# Patient Record
Sex: MALE | Race: WHITE | NOT HISPANIC OR LATINO | URBAN - NONMETROPOLITAN AREA
[De-identification: names, ages, dates, MRNs, and addresses within clinical notes are randomized per-mention and may not be internally consistent; named-entity substitution may affect disease eponyms.]

---

## 2017-10-23 NOTE — PATIENT DISCUSSION
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 1.5 DD - SUPERIOR/ FLAT - Plan : Monitor for changes.

## 2017-10-23 NOTE — PATIENT DISCUSSION
(J52.032) Other secondary cataract, left eye - Assesment : Significant posterior capsule opacification present. - Plan : YAG OS

## 2017-10-23 NOTE — PATIENT DISCUSSION
(H35.373) Puckering of macula, bilateral - Assesment : Examination revealed ERM. - Plan : Monitor. Advised patient to call our office with decreased vision or increased symptoms.  Rtc 1 year Exam

## 2017-12-04 NOTE — PATIENT DISCUSSION
(I51.444) Other secondary cataract, left eye - Assesment : s/p Yag Cap. Doing well. - Plan : Monitor for changes. RTC in October for Exam, sooner if problems or changes occur.

## 2018-10-09 NOTE — PATIENT DISCUSSION
(H35.373) Puckering of macula, bilateral - Assesment : Examination revealed ERM. - Plan : Monitor. Advised patient to call our office with decreased vision or increased symptoms.  Rtc 1 year Exam/ MAC OCT

## 2019-05-06 ENCOUNTER — IMPORTED ENCOUNTER (OUTPATIENT)
Dept: URBAN - NONMETROPOLITAN AREA CLINIC 1 | Facility: CLINIC | Age: 44
End: 2019-05-06

## 2019-05-06 PROBLEM — H52.03: Noted: 2019-05-06

## 2019-05-06 PROBLEM — H25.13: Noted: 2019-05-06

## 2019-05-06 PROBLEM — H52.4: Noted: 2019-05-06

## 2019-05-06 PROCEDURE — S0620 ROUTINE OPHTHALMOLOGICAL EXA: HCPCS

## 2019-05-06 NOTE — PATIENT DISCUSSION
Hyperopia OU-new spectacle Rx issued-monitor yearly or prn Cataracts OU-not yet surgical-no vision complaints-monitor yearly

## 2022-04-15 ASSESSMENT — TONOMETRY
OD_IOP_MMHG: 15
OS_IOP_MMHG: 17

## 2022-04-15 ASSESSMENT — VISUAL ACUITY
OS_CC: 20/20
OU_CC: 20/20
OD_CC: 20/20

## 2022-10-18 NOTE — PATIENT DISCUSSION
MAC OCT performed today OU . MRX from today fluctuating , recommend rechecking in future if pt wishes to get new glasses .

## 2024-08-19 ENCOUNTER — COMPREHENSIVE EXAM (OUTPATIENT)
Dept: URBAN - NONMETROPOLITAN AREA CLINIC 4 | Facility: CLINIC | Age: 49
End: 2024-08-19

## 2024-08-19 DIAGNOSIS — H52.01: ICD-10-CM

## 2024-08-19 DIAGNOSIS — H52.4: ICD-10-CM

## 2024-08-19 PROCEDURE — 92015 DETERMINE REFRACTIVE STATE: CPT

## 2024-08-19 PROCEDURE — 92004 COMPRE OPH EXAM NEW PT 1/>: CPT

## 2024-08-19 ASSESSMENT — VISUAL ACUITY
OS_SC: 20/20
OD_SC: 20/20

## 2024-08-19 ASSESSMENT — TONOMETRY
OD_IOP_MMHG: 13
OS_IOP_MMHG: 15

## 2025-08-25 ENCOUNTER — COMPREHENSIVE EXAM (OUTPATIENT)
Age: 50
End: 2025-08-25

## 2025-08-25 DIAGNOSIS — H52.01: ICD-10-CM

## 2025-08-25 DIAGNOSIS — H52.4: ICD-10-CM

## 2025-08-25 PROCEDURE — 92014 COMPRE OPH EXAM EST PT 1/>: CPT

## 2025-08-25 PROCEDURE — 92015 DETERMINE REFRACTIVE STATE: CPT
